# Patient Record
Sex: MALE | Race: WHITE | Employment: UNEMPLOYED | URBAN - METROPOLITAN AREA
[De-identification: names, ages, dates, MRNs, and addresses within clinical notes are randomized per-mention and may not be internally consistent; named-entity substitution may affect disease eponyms.]

---

## 2017-03-03 ENCOUNTER — HOSPITAL ENCOUNTER (EMERGENCY)
Age: 42
Discharge: HOME | End: 2017-03-03
Attending: EMERGENCY MEDICINE
Payer: SELF-PAY

## 2017-03-03 VITALS
BODY MASS INDEX: 29.35 KG/M2 | DIASTOLIC BLOOD PRESSURE: 88 MMHG | HEART RATE: 74 BPM | WEIGHT: 205 LBS | HEIGHT: 70 IN | TEMPERATURE: 97.9 F | SYSTOLIC BLOOD PRESSURE: 148 MMHG | OXYGEN SATURATION: 96 % | RESPIRATION RATE: 18 BRPM

## 2017-03-03 DIAGNOSIS — M54.50 ACUTE LOW BACK PAIN WITHOUT SCIATICA, UNSPECIFIED BACK PAIN LATERALITY: Primary | ICD-10-CM

## 2017-03-03 DIAGNOSIS — M62.830 MUSCLE SPASM OF BACK: ICD-10-CM

## 2017-03-03 PROCEDURE — 99282 EMERGENCY DEPT VISIT SF MDM: CPT

## 2017-03-03 RX ORDER — PHENOL/SODIUM PHENOLATE
20 AEROSOL, SPRAY (ML) MUCOUS MEMBRANE DAILY
COMMUNITY
End: 2018-04-14

## 2017-03-03 RX ORDER — TRAMADOL HYDROCHLORIDE 50 MG/1
50 TABLET ORAL
Qty: 20 TAB | Refills: 0 | Status: SHIPPED | OUTPATIENT
Start: 2017-03-03 | End: 2018-04-14

## 2017-03-03 RX ORDER — CARISOPRODOL 350 MG/1
350 TABLET ORAL 4 TIMES DAILY
Qty: 20 TAB | Refills: 0 | Status: SHIPPED | OUTPATIENT
Start: 2017-03-03 | End: 2018-04-14

## 2017-03-03 NOTE — ED PROVIDER NOTES
HPI Comments: 9:28 AM   Omega Cunningham is a 46 y.o. male presenting to the ED C/O left lower back pain (7/10) with swelling, onset yesterday. Pain is worse with movement. Pt works as a . PMHx lower back pain. Pt denies h/o trauma, urinary or fecal continence, numbness or tingling in the legs, dysuria, hematuria, and any other Sx or complaints. Written by GRADY Francis, as dictated by Tom Yeung PA-C. Patient is a 46 y.o. male presenting with back pain. The history is provided by the patient. No  was used. Back Pain    This is a new problem. The current episode started yesterday. The pain is present in the lower back. The pain is at a severity of 7/10. Pertinent negatives include no numbness (or tingling in the legs) and no dysuria. Past Medical History:   Diagnosis Date    Acid reflux     Asthma     Depression     High cholesterol     Hypertension        History reviewed. No pertinent surgical history. History reviewed. No pertinent family history. Social History     Social History    Marital status:      Spouse name: N/A    Number of children: N/A    Years of education: N/A     Occupational History    Not on file. Social History Main Topics    Smoking status: Current Every Day Smoker     Packs/day: 0.50    Smokeless tobacco: Never Used    Alcohol use Yes      Comment: now and then    Drug use: No    Sexual activity: Not on file     Other Topics Concern    Not on file     Social History Narrative         ALLERGIES: Pcn [penicillins]    Review of Systems   Genitourinary: Negative for dysuria and hematuria. - urinary or fecal incontinence    Musculoskeletal: Positive for back pain (Lower with swelling). Neurological: Negative for numbness (or tingling in the legs). All other systems reviewed and are negative.       Vitals:    03/03/17 0921   BP: 148/88   Pulse: 74   Resp: 18   Temp: 97.9 °F (36.6 °C)   SpO2: 96% Weight: 93 kg (205 lb)   Height: 5' 10\" (1.778 m)            Physical Exam   Constitutional: He is oriented to person, place, and time. He appears well-developed and well-nourished. No distress. HENT:   Head: Normocephalic and atraumatic. Eyes: Conjunctivae and EOM are normal. Pupils are equal, round, and reactive to light. Neck: Normal range of motion. Neck supple. Cardiovascular: Normal rate and regular rhythm. Pulmonary/Chest: Effort normal and breath sounds normal.   Musculoskeletal: Normal range of motion. He exhibits tenderness. He exhibits no edema. Cervical back: Normal.        Thoracic back: Normal.        Lumbar back: He exhibits tenderness and pain. He exhibits normal range of motion, no bony tenderness, no swelling, no edema, no deformity and no laceration. Back:    Negative SLR bilaterally; sensation normal BLE's   Neurological: He is alert and oriented to person, place, and time. He has normal strength. No sensory deficit. Gait normal.   Skin: Skin is warm and dry. Psychiatric: He has a normal mood and affect. His behavior is normal.   Nursing note and vitals reviewed. RESULTS:    No orders to display        Labs Reviewed - No data to display    No results found for this or any previous visit (from the past 12 hour(s)). MDM  Number of Diagnoses or Management Options    ED Course     MEDICATIONS GIVEN:  Medications - No data to display     Procedures    PROGRESS NOTE:   9:28 AM  Initial assessment performed. Written by William Davis ED Scribe, as dictated by Cosmo Jackson PA-C.    DISCHARGE NOTE:  9:35 AM   Omega Cunningham's  results have been reviewed with him. He has been counseled regarding his diagnosis, treatment, and plan. He verbally conveys understanding and agreement of the signs, symptoms, diagnosis, treatment and prognosis and additionally agrees to follow up as discussed.   He also agrees with the care-plan and conveys that all of his questions have been answered. I have also provided discharge instructions for him that include: educational information regarding their diagnosis and treatment, and list of reasons why they would want to return to the ED prior to their follow-up appointment, should his condition change. The patient and/or family have been provided with education for proper Emergency Department utilization. CLINICAL IMPRESSION:    1. Acute low back pain without sciatica, unspecified back pain laterality    2. Muscle spasm of back        AFTER VISIT PLAN:    Current Discharge Medication List      START taking these medications    Details   carisoprodol (SOMA) 350 mg tablet Take 1 Tab by mouth four (4) times daily. Max Daily Amount: 1,400 mg. Qty: 20 Tab, Refills: 0      traMADol (ULTRAM) 50 mg tablet Take 1 Tab by mouth every six (6) hours as needed for Pain. Max Daily Amount: 200 mg. Qty: 20 Tab, Refills: 0         CONTINUE these medications which have NOT CHANGED    Details   Omeprazole delayed release (PRILOSEC D/R) 20 mg tablet Take 20 mg by mouth daily. amphetamine-dextroamphetamine XR (ADDERALL XR) 15 mg XR capsule Take 15 mg by mouth every morning. divalproex DR (DEPAKOTE) 500 mg tablet Take  by mouth three (3) times daily. LISINOPRIL PO Take  by mouth. AMLODIPINE BESYLATE (NORVASC PO) Take  by mouth. PRAVASTATIN SODIUM (PRAVASTATIN PO) Take  by mouth. aspirin 81 mg chewable tablet Take 81 mg by mouth daily. ALBUTEROL IN Take  by inhalation. Follow-up Information     Follow up With Details Comments Contact Info    [facility] Schedule an appointment as soon as possible for a visit in 2 days or follow up with your primary care physician. [address] [facility] [facility] [address]    [facility] EM[facility]  As needed, If symptoms worsen [address]  [address] [facility] [address] [phone]           Attestations:   This note is prepared by Juan Joshua, acting as Scribe for Opal Rodriguez. Felice Brown PA-C: The scribe's documentation has been prepared under my direction and personally reviewed by me in its entirety. I confirm that the note above accurately reflects all work, treatment, procedures, and medical decision making performed by me.

## 2017-03-03 NOTE — LETTER
[facility] [place] 
[facility] EMERGENCY DEPT 
[address] [address] [phone] Work/School Note Date: 3/3/2017 To Whom It May concern: 
 
Omega Cunningham was seen and treated today in the emergency room by the following provider(s): 
Attending Provider: Enrique Bauer MD 
Physician Assistant: ANUSHA Rubin. Omega Cunningham may return to work on Saturday, March 4, 2017.  
 
Sincerely, 
 
 
 
 
Alden Doshi PA-C

## 2017-03-03 NOTE — DISCHARGE INSTRUCTIONS
Back Pain: Care Instructions  Your Care Instructions    Back pain has many possible causes. It is often related to problems with muscles and ligaments of the back. It may also be related to problems with the nerves, discs, or bones of the back. Moving, lifting, standing, sitting, or sleeping in an awkward way can strain the back. Sometimes you don't notice the injury until later. Arthritis is another common cause of back pain. Although it may hurt a lot, back pain usually improves on its own within several weeks. Most people recover in 12 weeks or less. Using good home treatment and being careful not to stress your back can help you feel better sooner. Follow-up care is a key part of your treatment and safety. Be sure to make and go to all appointments, and call your doctor if you are having problems. Its also a good idea to know your test results and keep a list of the medicines you take. How can you care for yourself at home? · Sit or lie in positions that are most comfortable and reduce your pain. Try one of these positions when you lie down:  ¨ Lie on your back with your knees bent and supported by large pillows. ¨ Lie on the floor with your legs on the seat of a sofa or chair. Martha Settle on your side with your knees and hips bent and a pillow between your legs. ¨ Lie on your stomach if it does not make pain worse. · Do not sit up in bed, and avoid soft couches and twisted positions. Bed rest can help relieve pain at first, but it delays healing. Avoid bed rest after the first day of back pain. · Change positions every 30 minutes. If you must sit for long periods of time, take breaks from sitting. Get up and walk around, or lie in a comfortable position. · Try using a heating pad on a low or medium setting for 15 to 20 minutes every 2 or 3 hours. Try a warm shower in place of one session with the heating pad. · You can also try an ice pack for 10 to 15 minutes every 2 to 3 hours.  Put a thin cloth between the ice pack and your skin. · Take pain medicines exactly as directed. ¨ If the doctor gave you a prescription medicine for pain, take it as prescribed. ¨ If you are not taking a prescription pain medicine, ask your doctor if you can take an over-the-counter medicine. · Take short walks several times a day. You can start with 5 to 10 minutes, 3 or 4 times a day, and work up to longer walks. Walk on level surfaces and avoid hills and stairs until your back is better. · Return to work and other activities as soon as you can. Continued rest without activity is usually not good for your back. · To prevent future back pain, do exercises to stretch and strengthen your back and stomach. Learn how to use good posture, safe lifting techniques, and proper body mechanics. When should you call for help? Call your doctor now or seek immediate medical care if:  · You have new or worsening numbness in your legs. · You have new or worsening weakness in your legs. (This could make it hard to stand up.)  · You lose control of your bladder or bowels. Watch closely for changes in your health, and be sure to contact your doctor if:  · Your pain gets worse. · You are not getting better after 2 weeks. Where can you learn more? Go to http://cher-paula.info/. Enter R588 in the search box to learn more about \"Back Pain: Care Instructions. \"  Current as of: May 23, 2016  Content Version: 11.1  © 4145-2759 [employer]. Care instructions adapted under license by [employer] (which disclaims liability or warranty for this information). If you have questions about a medical condition or this instruction, always ask your healthcare professional. [address] any warranty or liability for your use of this information.

## 2018-04-14 ENCOUNTER — HOSPITAL ENCOUNTER (EMERGENCY)
Age: 43
Discharge: HOME | End: 2018-04-15
Attending: EMERGENCY MEDICINE
Payer: SELF-PAY

## 2018-04-14 VITALS
RESPIRATION RATE: 14 BRPM | WEIGHT: 210 LBS | OXYGEN SATURATION: 97 % | SYSTOLIC BLOOD PRESSURE: 154 MMHG | HEART RATE: 121 BPM | TEMPERATURE: 98.4 F | DIASTOLIC BLOOD PRESSURE: 106 MMHG | BODY MASS INDEX: 30.06 KG/M2 | HEIGHT: 70 IN

## 2018-04-14 DIAGNOSIS — R07.89 CHEST WALL PAIN: ICD-10-CM

## 2018-04-14 DIAGNOSIS — F10.929 ALCOHOLIC INTOXICATION WITH COMPLICATION (HCC): Primary | ICD-10-CM

## 2018-04-14 PROCEDURE — 93005 ELECTROCARDIOGRAM TRACING: CPT

## 2018-04-14 PROCEDURE — 81003 URINALYSIS AUTO W/O SCOPE: CPT | Performed by: EMERGENCY MEDICINE

## 2018-04-14 PROCEDURE — 80307 DRUG TEST PRSMV CHEM ANLYZR: CPT | Performed by: EMERGENCY MEDICINE

## 2018-04-14 PROCEDURE — 80053 COMPREHEN METABOLIC PANEL: CPT | Performed by: EMERGENCY MEDICINE

## 2018-04-14 PROCEDURE — 85025 COMPLETE CBC W/AUTO DIFF WBC: CPT | Performed by: EMERGENCY MEDICINE

## 2018-04-14 PROCEDURE — 84484 ASSAY OF TROPONIN QUANT: CPT | Performed by: EMERGENCY MEDICINE

## 2018-04-14 PROCEDURE — 99284 EMERGENCY DEPT VISIT MOD MDM: CPT

## 2018-04-15 ENCOUNTER — APPOINTMENT (OUTPATIENT)
Dept: GENERAL RADIOLOGY | Age: 43
End: 2018-04-15
Attending: EMERGENCY MEDICINE
Payer: SELF-PAY

## 2018-04-15 LAB
ALBUMIN SERPL-MCNC: 3.9 G/DL (ref 3.4–5)
ALBUMIN/GLOB SERPL: 1 {RATIO} (ref 0.8–1.7)
ALP SERPL-CCNC: 91 U/L (ref 45–117)
ALT SERPL-CCNC: 66 U/L (ref 16–61)
AMPHET UR QL SCN: NEGATIVE
ANION GAP SERPL CALC-SCNC: 13 MMOL/L (ref 3–18)
APPEARANCE UR: CLEAR
AST SERPL-CCNC: 38 U/L (ref 15–37)
ATRIAL RATE: 114 BPM
BARBITURATES UR QL SCN: NEGATIVE
BASOPHILS # BLD: 0 K/UL (ref 0–0.06)
BASOPHILS NFR BLD: 0 % (ref 0–2)
BENZODIAZ UR QL: NEGATIVE
BILIRUB SERPL-MCNC: 0.4 MG/DL (ref 0.2–1)
BILIRUB UR QL: NEGATIVE
BUN SERPL-MCNC: 11 MG/DL (ref 7–18)
BUN/CREAT SERPL: 12 (ref 12–20)
CALCIUM SERPL-MCNC: 9.5 MG/DL (ref 8.5–10.1)
CALCULATED P AXIS, ECG09: 60 DEGREES
CALCULATED R AXIS, ECG10: 41 DEGREES
CALCULATED T AXIS, ECG11: 37 DEGREES
CANNABINOIDS UR QL SCN: NEGATIVE
CHLORIDE SERPL-SCNC: 105 MMOL/L (ref 100–108)
CO2 SERPL-SCNC: 26 MMOL/L (ref 21–32)
COCAINE UR QL SCN: NEGATIVE
COLOR UR: YELLOW
CREAT SERPL-MCNC: 0.9 MG/DL (ref 0.6–1.3)
DIAGNOSIS, 93000: NORMAL
DIFFERENTIAL METHOD BLD: NORMAL
EOSINOPHIL # BLD: 0.3 K/UL (ref 0–0.4)
EOSINOPHIL NFR BLD: 3 % (ref 0–5)
ERYTHROCYTE [DISTWIDTH] IN BLOOD BY AUTOMATED COUNT: 12.8 % (ref 11.6–14.5)
ETHANOL SERPL-MCNC: 220 MG/DL (ref 0–3)
GLOBULIN SER CALC-MCNC: 3.8 G/DL (ref 2–4)
GLUCOSE SERPL-MCNC: 97 MG/DL (ref 74–99)
GLUCOSE UR STRIP.AUTO-MCNC: NEGATIVE MG/DL
HCT VFR BLD AUTO: 43.4 % (ref 36–48)
HDSCOM,HDSCOM: NORMAL
HGB BLD-MCNC: 15 G/DL (ref 13–16)
HGB UR QL STRIP: NEGATIVE
KETONES UR QL STRIP.AUTO: NEGATIVE MG/DL
LEUKOCYTE ESTERASE UR QL STRIP.AUTO: NEGATIVE
LYMPHOCYTES # BLD: 3.4 K/UL (ref 0.9–3.6)
LYMPHOCYTES NFR BLD: 36 % (ref 21–52)
MCH RBC QN AUTO: 30 PG (ref 24–34)
MCHC RBC AUTO-ENTMCNC: 34.6 G/DL (ref 31–37)
MCV RBC AUTO: 86.8 FL (ref 74–97)
METHADONE UR QL: NEGATIVE
MONOCYTES # BLD: 0.7 K/UL (ref 0.05–1.2)
MONOCYTES NFR BLD: 7 % (ref 3–10)
NEUTS SEG # BLD: 5.1 K/UL (ref 1.8–8)
NEUTS SEG NFR BLD: 54 % (ref 40–73)
NITRITE UR QL STRIP.AUTO: NEGATIVE
OPIATES UR QL: NEGATIVE
P-R INTERVAL, ECG05: 128 MS
PCP UR QL: NEGATIVE
PH UR STRIP: 6 [PH] (ref 5–8)
PLATELET # BLD AUTO: 235 K/UL (ref 135–420)
PMV BLD AUTO: 10.6 FL (ref 9.2–11.8)
POTASSIUM SERPL-SCNC: 4.6 MMOL/L (ref 3.5–5.5)
PROT SERPL-MCNC: 7.7 G/DL (ref 6.4–8.2)
PROT UR STRIP-MCNC: NEGATIVE MG/DL
Q-T INTERVAL, ECG07: 340 MS
QRS DURATION, ECG06: 96 MS
QTC CALCULATION (BEZET), ECG08: 468 MS
RBC # BLD AUTO: 5 M/UL (ref 4.7–5.5)
SODIUM SERPL-SCNC: 144 MMOL/L (ref 136–145)
SP GR UR REFRACTOMETRY: 1 (ref 1–1.03)
TROPONIN I SERPL-MCNC: <0.02 NG/ML (ref 0–0.06)
UROBILINOGEN UR QL STRIP.AUTO: 0.2 EU/DL (ref 0.2–1)
VENTRICULAR RATE, ECG03: 114 BPM
WBC # BLD AUTO: 9.6 K/UL (ref 4.6–13.2)

## 2018-04-15 NOTE — ED NOTES
Patient armband removed and shredded  I have reviewed discharge instructions with the patient and spouse. The patient and spouse verbalized understanding.

## 2018-04-15 NOTE — ED TRIAGE NOTES
Presented to ED via EMS to be evaluated for reported mid chest pain with pain/numbness to left arm. Patient reports onset this p.m. Patient reports discomfort as documented. Patient states onset while playing \"Nav\". Patient is noted to be able to move affected extremity without difficulty. Patient on CCM with noted sinus tach. Patient also reports \"chest pressure\" x 2 days ago. Reports hx of GERD in which he takes Nexium 20mg. According to EMS Personnel patient became aggressive and was noted to be \"bouncing around like he was in a boxing ring\". It was also reported that patient was noted to be moving reported affected extremity without difficulty. Sepsis Screening completed    (  )Patient meets SIRS criteria. (xPatient does not meet SIRS criteria.       SIRS Criteria is achieved when two or more of the following are present   Temperature < 96.8°F (36°C) or > 100.9°F (38.3°C)   Heart Rate > 90 beats per minute   Respiratory Rate > 20 breaths per minute   WBC count > 12,000 or <4,000 or > 10% bands

## 2018-04-15 NOTE — DISCHARGE INSTRUCTIONS
Alcohol, Drug, or Poison Ingestion: Care Instructions  Your Care Instructions    A person can become very sick, or die, from swallowing or using alcohol, drugs, or poisons. Alcohol poisoning occurs when a person drinks a large amount of alcohol. Alcohol can stop nerve signals that control breathing. It can also stop the gag reflex that prevents choking. Alcohol poisoning is serious. It can lead to brain damage or death if it's not treated right away. Drugs can be used by accident or on purpose. They can be swallowed, inhaled, injected, or absorbed through the skin. Drugs include over-the-counter medicine (such as aspirin or acetaminophen) and prescription medicine. They also include vitamins and supplements. And they include illegal drugs such as cocaine and heroin. And poisons are all around us. They include household , cosmetics, houseplants, and garden chemicals. The doctor has checked you carefully, but problems can develop later. If you notice any problems or new symptoms, get medical treatment right away. Follow-up care is a key part of your treatment and safety. Be sure to make and go to all appointments, and call your doctor if you are having problems. It's also a good idea to know your test results and keep a list of the medicines you take. How can you care for yourself at home? Alcohol problems  · Talk to your doctor or counselor about programs that can help you stop using alcohol. · Plan ways to avoid being tempted to drink. ¨ Get rid of all alcohol in your home. ¨ Avoid places where you tend to drink. ¨ Stay away from places or events that offer alcohol. ¨ Stay away from people who drink a lot. Drug problems  · Talk to your doctor about programs that can help you stop using drugs. · Get rid of any drugs you might be tempted to misuse. · Learn how to say no when other people use drugs. · Don't spend time with people who use drugs.   Poison prevention  · Keep products in the containers they came in. Keep them with the original labels. · Be careful when you use cleaning products, paints, solvents, and pesticides. Read labels before use. Use a fan to move strong odors and fumes out of your home. · Do not mix cleaning products. Try to use nontoxic . These include vinegar, lemon juice, and baking soda. When should you call for help? Poison control centers, hospitals, or your doctor can give immediate advice in the case of a poisoning. The [facility] [employer] number is [phone]. Have the poison container with you so you can give complete information to the poison control center, such as what the poison or substance is, how much was taken and when. Do not try to make the person vomit. ?Call 911 anytime you think you may need emergency care. For example, call if you or someone else:  ? · Has used or currently uses alcohol or drugs and is very confused or can't stay awake. ? · Has passed out (lost consciousness). ? · Has severe trouble breathing. ? · Is having a seizure. ?Call your doctor now or seek immediate medical care if you or someone else:  ? · Has new symptoms, or is not acting normally. ? Watch closely for changes in your health, and be sure to contact your doctor if:  ? · You do not get better as expected. ? · You need help with drug or alcohol problems. ? · You have problems with depression or other mental health issues. Where can you learn more? Go to http://cher-paula.info/. Enter V488 in the search box to learn more about \"Alcohol, Drug, or Poison Ingestion: Care Instructions. \"  Current as of: March 20, 2017  Content Version: 11.4  © 4809-6915 [employer]. Care instructions adapted under license by [employer] (which disclaims liability or warranty for this information).  If you have questions about a medical condition or this instruction, always ask your healthcare professional. [address] any warranty or liability for your use of this information.

## 2018-04-15 NOTE — ED PROVIDER NOTES
EMERGENCY DEPARTMENT HISTORY AND PHYSICAL EXAM    Date: 4/14/2018  Patient Name: Peter Cunningham    History of Presenting Illness     Chief Complaint   Patient presents with    Chest Pain    Arm Pain         History Provided By: Patient    Chief Complaint: arm weakness and numbness  Duration: 3-4 Days  Timing:  Worsening  Location: left arm  Severity: 8 out of 10  Associated Symptoms: left shoulder pain/left sided chest pain    Additional History (Context):     11:48 PM    Omega Cunningham is a 38 y.o. male with pertinent PMHx of HTN, GERD, asthma, and HLD presenting via EMS to the ED c/o left arm weakness and numbness x 3-4 days, which became worse today. Pt states that he was sitting down and playing a game of [employer] when he dropped his arms. Per EMS, at the scene and currently he was using all four extremities normally. Pt notes an associated symptom of 8/10 left shoulder pain and left sided chest pain. The pain has also been intermittent for the last 3-4 days. Pt denies any recent injuries or trauma. Pt has no hx of cardiac disease or DM. Pt ran out of all of his regular medications, as he could not afford the refills. Pt notes a family hx of cardiac disease. Pt specifically denies any bowel changes, urinary sxs, vomiting, fever/chills, or cough. PCP: None  Social Hx: + tobacco use, + alcohol use, - illicit drug use    There are no other complaints, changes, or physical findings at this time. Current Facility-Administered Medications   Medication Dose Route Frequency Provider Last Rate Last Dose    sodium chloride 0.9 % bolus infusion 1,000 mL  1,000 mL IntraVENous ONCE Catherine Rojas MD         Current Outpatient Prescriptions   Medication Sig Dispense Refill    amphetamine-dextroamphetamine XR (ADDERALL XR) 15 mg XR capsule Take 15 mg by mouth every morning.  divalproex DR (DEPAKOTE) 500 mg tablet Take  by mouth three (3) times daily.  LISINOPRIL PO Take  by mouth.         AMLODIPINE BESYLATE (NORVASC PO) Take  by mouth.  PRAVASTATIN SODIUM (PRAVASTATIN PO) Take  by mouth.  aspirin 81 mg chewable tablet Take 81 mg by mouth daily.  ALBUTEROL IN Take  by inhalation. Past History     Past Medical History:  Past Medical History:   Diagnosis Date    Acid reflux     Asthma     Depression     High cholesterol     Hypertension        Past Surgical History:  History reviewed. No pertinent surgical history. Family History:  History reviewed. No pertinent family history. Social History:  Social History   Substance Use Topics    Smoking status: Current Every Day Smoker     Packs/day: 0.50    Smokeless tobacco: Never Used    Alcohol use Yes      Comment: 6 beers tonight       Allergies: Allergies   Allergen Reactions    Pcn [Penicillins] Hives         Review of Systems   Review of Systems   Constitutional: Negative for chills and fever. Cardiovascular: Positive for chest pain. Gastrointestinal: Negative. Negative for vomiting. Genitourinary: Negative. Musculoskeletal: Positive for arthralgias (left shoulder). Neurological: Negative for weakness (left arm) and numbness (left arm). All other systems reviewed and are negative. Physical Exam     Vitals:    04/14/18 2330   BP: (!) 154/106   Pulse: (!) 121   Resp: 14   Temp: 98.4 °F (36.9 °C)   SpO2: 97%   Weight: 95.3 kg (210 lb)   Height: 5' 10\" (1.778 m)     Physical Exam   Constitutional: He is oriented to person, place, and time. He appears well-developed. Smells of ETOH   HENT:   Head: Normocephalic and atraumatic. Eyes: Pupils are equal, round, and reactive to light. Neck: Normal range of motion. Cardiovascular: Regular rhythm. Tachycardia present. Pulmonary/Chest: Effort normal and breath sounds normal. He exhibits tenderness (left upper, chest wall, severe). Abdominal: Soft. Genitourinary: Penis normal.   Musculoskeletal: Normal range of motion.    Neurological: He is alert and oriented to person, place, and time. No cranial nerve deficit. He exhibits normal muscle tone. Coordination normal.   5/5 strength in his biceps, triceps, and shoulder  Finger-nose-finger is nml   Psychiatric: He has a normal mood and affect. Nursing note and vitals reviewed. Diagnostic Study Results     Labs -     Recent Results (from the past 12 hour(s))   URINALYSIS W/ RFLX MICROSCOPIC    Collection Time: 04/14/18 11:50 PM   Result Value Ref Range    Color YELLOW      Appearance CLEAR      Specific gravity 1.005 1.005 - 1.030      pH (UA) 6.0 5.0 - 8.0      Protein NEGATIVE  NEG mg/dL    Glucose NEGATIVE  NEG mg/dL    Ketone NEGATIVE  NEG mg/dL    Bilirubin NEGATIVE  NEG      Blood NEGATIVE  NEG      Urobilinogen 0.2 0.2 - 1.0 EU/dL    Nitrites NEGATIVE  NEG      Leukocyte Esterase NEGATIVE  NEG     DRUG SCREEN, URINE    Collection Time: 04/14/18 11:50 PM   Result Value Ref Range    BENZODIAZEPINES NEGATIVE  NEG      BARBITURATES NEGATIVE  NEG      THC (TH-CANNABINOL) NEGATIVE  NEG      OPIATES NEGATIVE  NEG      PCP(PHENCYCLIDINE) NEGATIVE  NEG      COCAINE NEGATIVE  NEG      AMPHETAMINES NEGATIVE  NEG      METHADONE NEGATIVE  NEG      HDSCOM (NOTE)    CBC WITH AUTOMATED DIFF    Collection Time: 04/14/18 11:50 PM   Result Value Ref Range    WBC 9.6 4.6 - 13.2 K/uL    RBC 5.00 4.70 - 5.50 M/uL    HGB 15.0 13.0 - 16.0 g/dL    HCT 43.4 36.0 - 48.0 %    MCV 86.8 74.0 - 97.0 FL    MCH 30.0 24.0 - 34.0 PG    MCHC 34.6 31.0 - 37.0 g/dL    RDW 12.8 11.6 - 14.5 %    PLATELET 450 591 - 272 K/uL    MPV 10.6 9.2 - 11.8 FL    NEUTROPHILS 54 40 - 73 %    LYMPHOCYTES 36 21 - 52 %    MONOCYTES 7 3 - 10 %    EOSINOPHILS 3 0 - 5 %    BASOPHILS 0 0 - 2 %    ABS. NEUTROPHILS 5.1 1.8 - 8.0 K/UL    ABS. LYMPHOCYTES 3.4 0.9 - 3.6 K/UL    ABS. MONOCYTES 0.7 0.05 - 1.2 K/UL    ABS. EOSINOPHILS 0.3 0.0 - 0.4 K/UL    ABS.  BASOPHILS 0.0 0.0 - 0.06 K/UL    DF AUTOMATED     METABOLIC PANEL, COMPREHENSIVE    Collection Time: 04/14/18 11:50 PM   Result Value Ref Range    Sodium 144 136 - 145 mmol/L    Potassium 4.6 3.5 - 5.5 mmol/L    Chloride 105 100 - 108 mmol/L    CO2 26 21 - 32 mmol/L    Anion gap 13 3.0 - 18 mmol/L    Glucose 97 74 - 99 mg/dL    BUN 11 7.0 - 18 MG/DL    Creatinine 0.90 0.6 - 1.3 MG/DL    BUN/Creatinine ratio 12 12 - 20      GFR est AA >60 >60 ml/min/1.73m2    GFR est non-AA >60 >60 ml/min/1.73m2    Calcium 9.5 8.5 - 10.1 MG/DL    Bilirubin, total PENDING MG/DL    ALT (SGPT) PENDING U/L    AST (SGOT) PENDING U/L    Alk. phosphatase PENDING U/L    Protein, total PENDING g/dL    Albumin PENDING g/dL    Globulin PENDING g/dL    A-G Ratio PENDING         Radiologic Studies -   XR CHEST PA LAT    (Results Pending)     CXR was not done before discharge. Medical Decision Making   I am the first provider for this patient. I reviewed the vital signs, available nursing notes, past medical history, past surgical history, family history and social history. Vital Signs-Reviewed the patient's vital signs. Pulse Oximetry Analysis - 97% on RA     Cardiac Monitor:  Rate: 116 bpm  Rhythm: sinus tachycardia    EKG interpretation: (Preliminary)  Sinus tachycardia at 114 bpm. No acute process. EKG read by Bibiana Fischer MD at [address]     Records Reviewed: Nursing Notes and Old Medical Records    Provider Notes (Medical Decision Making): acute alcohol intoxication. Pt has not been taking any of his home medications and is out of them. Drug intoxication is included in these differentials. Stroke is not a part of the DDx, as he had a nml neuro exam, no numbness, no tingling, and full strength. Pt states that he dropped his cards, but per EMS at the scene and currently he was using all four extremities normally. Pt has no signs of a stroke at this time.     PROCEDURES:  Procedures    MEDICATIONS GIVEN IN THE ED:  Medications   sodium chloride 0.9 % bolus infusion 1,000 mL (not administered)        ED COURSE:   11:48 PM   Initial assessment performed. 12:16 AM - Pt is being belligerent and threatening, stating that this is a \"sorry [facility] hospital\" and that he would like to go home. Pt's fiance is willing is to take him home. Explained that the pt has not had a workup yet, including blood work or CXR. Security and police had to be called, as the pt was continuing to be more belligerent. Fiance is willing to take home. Risks and benefits where discussed with pt and fiance. Diagnosis and Disposition     KINDER DISCHARGE  12:18 AM    I informed the pt that he needed further observation, further workup for adequate evaluation for his numbness, chest pain, and that by refusing the above, he is at risk for myocardial infarction, arrhythmia, sudden death, paralysis, further deterioration. He is awake, alert, and he understands his condition and the risks involved in leaving. He is clinically aware of his surroundings and able to ask appropriate questions, the patients significant other and the nurse present confirms he is not clinically intoxicated and able to make medical decisions. He verbalized knowing the risks and understood it was recommended that he stay and could also return at any time. his significant other was present for the discussion and also verbalized that they understood both diagnosis, risks and could return at any time. They were both provided with warnings regarding worsening of his condition and were provided instructions to follow up with None tomorrow or return to the Emergency Room as soon as possible. This discussion was witnessed by nurse Lauren Perez RN. CLINICAL IMPRESSION:  1. Alcoholic intoxication with complication ([address]    2. Chest wall pain        PLAN:  1. D/C Home  2. Current Discharge Medication List        3.    Follow-up Information     Follow up With Details Comments Contact Info    Ray Arias MD Schedule an appointment as soon as possible for a visit for primary care follow up, as needed [id number] [facility] [address]      [facility] EMERGENCY DEPT  As needed, If symptoms worsen [address] [place] [address]  [phone]        _______________________________    Attestations: This note is prepared by Fouzia Lozoya, acting as Scribe for Crystal Hernández MD.    Crystal Hernández MD:  The scribe's documentation has been prepared under my direction and personally reviewed by me in its entirety.   I confirm that the note above accurately reflects all work, treatment, procedures, and medical decision making performed by me.  _______________________________

## 2020-08-02 ENCOUNTER — HOSPITAL ENCOUNTER (EMERGENCY)
Age: 45
Discharge: HOME | End: 2020-08-02
Attending: EMERGENCY MEDICINE
Payer: MEDICAID

## 2020-08-02 ENCOUNTER — APPOINTMENT (OUTPATIENT)
Dept: GENERAL RADIOLOGY | Age: 45
End: 2020-08-02
Attending: EMERGENCY MEDICINE
Payer: MEDICAID

## 2020-08-02 VITALS
WEIGHT: 219 LBS | DIASTOLIC BLOOD PRESSURE: 82 MMHG | TEMPERATURE: 98.3 F | SYSTOLIC BLOOD PRESSURE: 122 MMHG | RESPIRATION RATE: 19 BRPM | HEART RATE: 99 BPM | BODY MASS INDEX: 31.35 KG/M2 | HEIGHT: 70 IN | OXYGEN SATURATION: 100 %

## 2020-08-02 DIAGNOSIS — F31.9 BIPOLAR 1 DISORDER (HCC): ICD-10-CM

## 2020-08-02 DIAGNOSIS — R07.89 MUSCULOSKELETAL CHEST PAIN: Primary | ICD-10-CM

## 2020-08-02 DIAGNOSIS — Z72.0 TOBACCO USE: ICD-10-CM

## 2020-08-02 DIAGNOSIS — Z78.9 ALCOHOL USE: ICD-10-CM

## 2020-08-02 DIAGNOSIS — M79.602 LEFT ARM PAIN: ICD-10-CM

## 2020-08-02 LAB
ALBUMIN SERPL-MCNC: 4.2 G/DL (ref 3.4–5)
ALBUMIN/GLOB SERPL: 1.1 {RATIO} (ref 0.8–1.7)
ALP SERPL-CCNC: 88 U/L (ref 45–117)
ALT SERPL-CCNC: 47 U/L (ref 16–61)
ANION GAP SERPL CALC-SCNC: 9 MMOL/L (ref 3–18)
AST SERPL-CCNC: 26 U/L (ref 10–38)
BASOPHILS # BLD: 0 K/UL (ref 0–0.1)
BASOPHILS NFR BLD: 0 % (ref 0–2)
BILIRUB SERPL-MCNC: 0.2 MG/DL (ref 0.2–1)
BNP SERPL-MCNC: 23 PG/ML (ref 0–450)
BUN SERPL-MCNC: 9 MG/DL (ref 7–18)
BUN/CREAT SERPL: 9 (ref 12–20)
CALCIUM SERPL-MCNC: 9.4 MG/DL (ref 8.5–10.1)
CHLORIDE SERPL-SCNC: 107 MMOL/L (ref 100–111)
CK MB CFR SERPL CALC: NORMAL % (ref 0–4)
CK MB SERPL-MCNC: <1 NG/ML (ref 5–25)
CK SERPL-CCNC: 126 U/L (ref 39–308)
CO2 SERPL-SCNC: 24 MMOL/L (ref 21–32)
CREAT SERPL-MCNC: 0.95 MG/DL (ref 0.6–1.3)
DIFFERENTIAL METHOD BLD: ABNORMAL
EOSINOPHIL # BLD: 0.3 K/UL (ref 0–0.4)
EOSINOPHIL NFR BLD: 3 % (ref 0–5)
ERYTHROCYTE [DISTWIDTH] IN BLOOD BY AUTOMATED COUNT: 12.5 % (ref 11.6–14.5)
ETHANOL SERPL-MCNC: 225 MG/DL (ref 0–3)
GLOBULIN SER CALC-MCNC: 3.9 G/DL (ref 2–4)
GLUCOSE SERPL-MCNC: 101 MG/DL (ref 74–99)
HCT VFR BLD AUTO: 48.9 % (ref 36–48)
HGB BLD-MCNC: 16.1 G/DL (ref 13–16)
LYMPHOCYTES # BLD: 3.7 K/UL (ref 0.9–3.6)
LYMPHOCYTES NFR BLD: 35 % (ref 21–52)
MCH RBC QN AUTO: 29.9 PG (ref 24–34)
MCHC RBC AUTO-ENTMCNC: 32.9 G/DL (ref 31–37)
MCV RBC AUTO: 90.9 FL (ref 74–97)
MONOCYTES # BLD: 0.7 K/UL (ref 0.05–1.2)
MONOCYTES NFR BLD: 7 % (ref 3–10)
NEUTS SEG # BLD: 5.8 K/UL (ref 1.8–8)
NEUTS SEG NFR BLD: 55 % (ref 40–73)
PLATELET # BLD AUTO: 257 K/UL (ref 135–420)
PMV BLD AUTO: 11 FL (ref 9.2–11.8)
POTASSIUM SERPL-SCNC: 4.1 MMOL/L (ref 3.5–5.5)
PROT SERPL-MCNC: 8.1 G/DL (ref 6.4–8.2)
RBC # BLD AUTO: 5.38 M/UL (ref 4.7–5.5)
SODIUM SERPL-SCNC: 140 MMOL/L (ref 136–145)
TROPONIN I SERPL-MCNC: <0.02 NG/ML (ref 0–0.04)
WBC # BLD AUTO: 10.5 K/UL (ref 4.6–13.2)

## 2020-08-02 PROCEDURE — 80307 DRUG TEST PRSMV CHEM ANLYZR: CPT

## 2020-08-02 PROCEDURE — 74011250637 HC RX REV CODE- 250/637: Performed by: EMERGENCY MEDICINE

## 2020-08-02 PROCEDURE — 82550 ASSAY OF CK (CPK): CPT

## 2020-08-02 PROCEDURE — 80053 COMPREHEN METABOLIC PANEL: CPT

## 2020-08-02 PROCEDURE — 94762 N-INVAS EAR/PLS OXIMTRY CONT: CPT

## 2020-08-02 PROCEDURE — 99285 EMERGENCY DEPT VISIT HI MDM: CPT

## 2020-08-02 PROCEDURE — 83880 ASSAY OF NATRIURETIC PEPTIDE: CPT

## 2020-08-02 PROCEDURE — 85025 COMPLETE CBC W/AUTO DIFF WBC: CPT

## 2020-08-02 PROCEDURE — 71045 X-RAY EXAM CHEST 1 VIEW: CPT

## 2020-08-02 RX ORDER — GUAIFENESIN 100 MG/5ML
81 LIQUID (ML) ORAL
Status: COMPLETED | OUTPATIENT
Start: 2020-08-02 | End: 2020-08-02

## 2020-08-02 RX ORDER — METHOCARBAMOL 500 MG/1
1000 TABLET, FILM COATED ORAL ONCE
Status: COMPLETED | OUTPATIENT
Start: 2020-08-02 | End: 2020-08-02

## 2020-08-02 RX ORDER — METHOCARBAMOL 500 MG/1
1000 TABLET, FILM COATED ORAL 3 TIMES DAILY
Qty: 30 TAB | Refills: 0 | Status: SHIPPED | OUTPATIENT
Start: 2020-08-02

## 2020-08-02 RX ADMIN — METHOCARBAMOL TABLETS 1000 MG: 500 TABLET, COATED ORAL at 02:02

## 2020-08-02 RX ADMIN — ASPIRIN 81 MG 81 MG: 81 TABLET ORAL at 02:03

## 2020-08-02 NOTE — ED PROVIDER NOTES
EMERGENCY DEPARTMENT HISTORY AND PHYSICAL EXAM    Date: 8/2/2020  Patient Name: Yeyo Cunningham    History of Presenting Illness     Chief Complaint   Patient presents with    Chest Pain         History Provided By: Patient    Additional History (Context):   3:25 AM  Omega Cunningham is a 45 y.o. male with PMHX of HTN, GERD, hypercholesterolemia, asthma who presents to the emergency department C/O left sided chest pain. He has had three days of sharp constant pain to the left chest heidi to the lateral pecs region, often shooting towards the left elbow. Symptoms have been constant for three day. Vidya Frizzle He has had significant alcohol use tonight and cannot elaborate further    Social History  He acknowledges alcohol use as well as smoking TOB, denies illegal drugs, heidi cocaine    Family History  No premature heart disease      PCP: None    Current Outpatient Medications   Medication Sig Dispense Refill    methocarbamoL (Robaxin) 500 mg tablet Take 2 Tabs by mouth three (3) times daily. 30 Tab 0    amphetamine-dextroamphetamine XR (ADDERALL XR) 15 mg XR capsule Take 15 mg by mouth every morning.  divalproex DR (DEPAKOTE) 500 mg tablet Take  by mouth three (3) times daily.  LISINOPRIL PO Take  by mouth.  AMLODIPINE BESYLATE (NORVASC PO) Take  by mouth.  PRAVASTATIN SODIUM (PRAVASTATIN PO) Take  by mouth.  aspirin 81 mg chewable tablet Take 81 mg by mouth daily.  ALBUTEROL IN Take  by inhalation. Past History     Past Medical History:  Past Medical History:   Diagnosis Date    Acid reflux     Asthma     Depression     High cholesterol     Hypertension        Past Surgical History:  History reviewed. No pertinent surgical history. Family History:  History reviewed. No pertinent family history.     Social History:  Social History     Tobacco Use    Smoking status: Current Every Day Smoker     Packs/day: 0.50    Smokeless tobacco: Never Used   Substance Use Topics    Alcohol use: Yes     Comment: 6 beers tonight    Drug use: No       Allergies: Allergies   Allergen Reactions    Pcn [Penicillins] Hives         Review of Systems   Review of Systems   Unable to perform ROS: Mental status change   Respiratory: Positive for chest tightness. Cardiovascular: Positive for chest pain. Physical Exam     Vitals:    08/02/20 0112 08/02/20 0210 08/02/20 0230 08/02/20 0312   BP: (!) 149/105 128/71 121/68 122/82   Pulse: 86  98 99   Resp: 18  16 19   Temp: 98.3 °F (36.8 °C)      SpO2: 98% 98% 98% 100%   Weight: 99.3 kg (219 lb)      Height: 5' 10\" (1.778 m)        Physical Exam  Vitals signs and nursing note reviewed. Constitutional:       General: He is not in acute distress. Appearance: He is well-developed. He is not diaphoretic. HENT:      Head: Normocephalic and atraumatic. Right Ear: External ear normal.      Left Ear: External ear normal.      Mouth/Throat:      Pharynx: No oropharyngeal exudate. Eyes:      General: No scleral icterus. Extraocular Movements:      Right eye: Nystagmus present. Left eye: Nystagmus present. Conjunctiva/sclera:      Right eye: Right conjunctiva is injected. Left eye: Left conjunctiva is injected. Pupils: Pupils are equal, round, and reactive to light. Comments: No pallor   Neck:      Musculoskeletal: Normal range of motion and neck supple. Thyroid: No thyromegaly. Vascular: No JVD. Trachea: No tracheal deviation. Cardiovascular:      Rate and Rhythm: Normal rate and regular rhythm. Heart sounds: Normal heart sounds. Pulmonary:      Effort: Pulmonary effort is normal. No respiratory distress. Breath sounds: Normal breath sounds. No stridor. Abdominal:      General: Bowel sounds are normal. There is no distension. Palpations: Abdomen is soft. Tenderness: There is no abdominal tenderness. There is no guarding or rebound. Musculoskeletal: Normal range of motion. General: No tenderness. Comments: No soft tissue injuries   Lymphadenopathy:      Cervical: No cervical adenopathy. Skin:     General: Skin is warm and dry. Findings: No erythema or rash. Neurological:      Mental Status: He is alert and oriented to person, place, and time. GCS: GCS eye subscore is 4. GCS verbal subscore is 5. GCS motor subscore is 6. Cranial Nerves: No cranial nerve deficit. Coordination: Coordination normal.   Psychiatric:         Behavior: Behavior normal.         Thought Content: Thought content normal.         Judgment: Judgment normal.             Diagnostic Study Results     Labs -     Recent Results (from the past 12 hour(s))   CBC WITH AUTOMATED DIFF    Collection Time: 08/02/20  1:20 AM   Result Value Ref Range    WBC 10.5 4.6 - 13.2 K/uL    RBC 5.38 4.70 - 5.50 M/uL    HGB 16.1 (H) 13.0 - 16.0 g/dL    HCT 48.9 (H) 36.0 - 48.0 %    MCV 90.9 74.0 - 97.0 FL    MCH 29.9 24.0 - 34.0 PG    MCHC 32.9 31.0 - 37.0 g/dL    RDW 12.5 11.6 - 14.5 %    PLATELET 800 443 - 714 K/uL    MPV 11.0 9.2 - 11.8 FL    NEUTROPHILS 55 40 - 73 %    LYMPHOCYTES 35 21 - 52 %    MONOCYTES 7 3 - 10 %    EOSINOPHILS 3 0 - 5 %    BASOPHILS 0 0 - 2 %    ABS. NEUTROPHILS 5.8 1.8 - 8.0 K/UL    ABS. LYMPHOCYTES 3.7 (H) 0.9 - 3.6 K/UL    ABS. MONOCYTES 0.7 0.05 - 1.2 K/UL    ABS. EOSINOPHILS 0.3 0.0 - 0.4 K/UL    ABS.  BASOPHILS 0.0 0.0 - 0.1 K/UL    DF AUTOMATED     METABOLIC PANEL, COMPREHENSIVE    Collection Time: 08/02/20  1:20 AM   Result Value Ref Range    Sodium 140 136 - 145 mmol/L    Potassium 4.1 3.5 - 5.5 mmol/L    Chloride 107 100 - 111 mmol/L    CO2 24 21 - 32 mmol/L    Anion gap 9 3.0 - 18 mmol/L    Glucose 101 (H) 74 - 99 mg/dL    BUN 9 7.0 - 18 MG/DL    Creatinine 0.95 0.6 - 1.3 MG/DL    BUN/Creatinine ratio 9 (L) 12 - 20      GFR est AA >60 >60 ml/min/1.73m2    GFR est non-AA >60 >60 ml/min/1.73m2    Calcium 9.4 8.5 - 10.1 MG/DL    Bilirubin, total 0.2 0.2 - 1.0 MG/DL    ALT (SGPT) 47 16 - 61 U/L    AST (SGOT) 26 10 - 38 U/L    Alk. phosphatase 88 45 - 117 U/L    Protein, total 8.1 6.4 - 8.2 g/dL    Albumin 4.2 3.4 - 5.0 g/dL    Globulin 3.9 2.0 - 4.0 g/dL    A-G Ratio 1.1 0.8 - 1.7     CARDIAC PANEL,(CK, CKMB & TROPONIN)    Collection Time: 08/02/20  1:20 AM   Result Value Ref Range    CK - MB <1.0 <3.6 ng/ml    CK-MB Index  0.0 - 4.0 %     CALCULATION NOT PERFORMED WHEN RESULT IS BELOW LINEAR LIMIT     39 - 308 U/L    Troponin-I, QT <0.02 0.0 - 0.045 NG/ML   ETHYL ALCOHOL    Collection Time: 08/02/20  1:20 AM   Result Value Ref Range    ALCOHOL(ETHYL),SERUM 225 (H) 0 - 3 MG/DL   NT-PRO BNP    Collection Time: 08/02/20  1:20 AM   Result Value Ref Range    NT pro-BNP 23 0 - 450 PG/ML       Radiologic Studies -   Port CXR  No acute findings, no cardiomegaly  Formal radiology report pending  Mikayla Nation MD    XR CHEST PORT    (Results Pending)     CT Results  (Last 48 hours)    None        CXR Results  (Last 48 hours)    None          Medications given in the ED-  Medications   aspirin chewable tablet 81 mg (81 mg Oral Given 8/2/20 0203)   methocarbamoL (ROBAXIN) tablet 1,000 mg (1,000 mg Oral Given 8/2/20 0202)         Medical Decision Making   I am the first provider for this patient. I reviewed the vital signs, available nursing notes, past medical history, past surgical history, family history and social history. Vital Signs-Reviewed the patient's vital signs.     Pulse Oximetry Analysis - 100% on room air    Cardiac Monitor:  Rate: 104 bpm  Rhythm: sins tach    EKG interpretation: (Preliminary)  3:25 AM   Sinus tach, rate 110, , [id number], normal ST segments  EKG read by Mikayla Nation MD     Records Reviewed: NURSING NOTES AND PREVIOUS MEDICAL RECORDS  Multiple cardiology visits, but none recently documented    Provider Notes (Medical Decision Making):   Constant pain for three days without changes in troponin or ST segment changes, possible but unlikely ACS.  Tachycardia is more lilkey EtOH than PE    Procedures:  Procedures    ED Course:   3:25 AM: Initial assessment performed. The patients presenting problems have been discussed, and they are in agreement with the care plan formulated and outlined with them. I have encouraged them to ask questions as they arise throughout their visit. Diagnosis and Disposition       DISCHARGE NOTE:    Omega Cunningham's  results have been reviewed with him. He has been counseled regarding his diagnosis, treatment, and plan. He verbally conveys understanding and agreement of the signs, symptoms, diagnosis, treatment and prognosis and additionally agrees to follow up as discussed. He also agrees with the care-plan and conveys that all of his questions have been answered. I have also provided discharge instructions for him that include: educational information regarding their diagnosis and treatment, and list of reasons why they would want to return to the ED prior to their follow-up appointment, should his condition change. He has been provided with education for proper emergency department utilization. CLINICAL IMPRESSION:    1. Musculoskeletal chest pain    2. Left arm pain    3. Alcohol use    4. Tobacco use    5. Bipolar 1 disorder ([facility]        PLAN:  1. D/C Home  2. Discharge Medication List as of 8/2/2020  3:05 AM      START taking these medications    Details   methocarbamoL (Robaxin) 500 mg tablet Take 2 Tabs by mouth three (3) times daily. , Print, Disp-30 Tab,R-0         CONTINUE these medications which have NOT CHANGED    Details   amphetamine-dextroamphetamine XR (ADDERALL XR) 15 mg XR capsule Take 15 mg by mouth every morning., Historical Med      divalproex DR (DEPAKOTE) 500 mg tablet Take  by mouth three (3) times daily. , Historical Med      LISINOPRIL PO Take  by mouth.  , Historical Med      AMLODIPINE BESYLATE (NORVASC PO) Take  by mouth.  , Historical Med      PRAVASTATIN SODIUM (PRAVASTATIN PO) Take by mouth.  , Historical Med      aspirin 81 mg chewable tablet Take 81 mg by mouth daily. , Historical Med      ALBUTEROL IN Take  by inhalation. , Historical Med           3. Follow-up Information     Follow up With Specialties Details Why Contact Info    Kitty Lopez NP Nurse Practitioner In 1 week  [address] [facility]      [facility] EMERGENCY DEPT Emergency Medicine  As needed [address] [place] [id number]  [phone]        _______________________________    This note was partially transcribed via voice recognition software. Although efforts have been made to catch any discrepancies, it may contain sound alike words, grammatical errors, or nonsensical words.

## 2020-08-02 NOTE — DISCHARGE INSTRUCTIONS

## 2020-08-02 NOTE — ED TRIAGE NOTES
Arrives via ems with c/o left side chest pain radiating down his left arm for the past couple days. Pt bc of the pain he has had difficulty maintaining a  on items with his left hand.

## 2023-09-27 ENCOUNTER — HOSPITAL ENCOUNTER (EMERGENCY)
Facility: HOSPITAL | Age: 48
Discharge: HOME | End: 2023-09-28
Attending: EMERGENCY MEDICINE
Payer: MEDICAID

## 2023-09-27 DIAGNOSIS — E86.0 DEHYDRATION: ICD-10-CM

## 2023-09-27 DIAGNOSIS — M25.562 ACUTE PAIN OF LEFT KNEE: Primary | ICD-10-CM

## 2023-09-27 PROCEDURE — 96360 HYDRATION IV INFUSION INIT: CPT

## 2023-09-27 PROCEDURE — 99284 EMERGENCY DEPT VISIT MOD MDM: CPT

## 2023-09-28 VITALS
SYSTOLIC BLOOD PRESSURE: 159 MMHG | TEMPERATURE: 97.9 F | DIASTOLIC BLOOD PRESSURE: 64 MMHG | HEART RATE: 100 BPM | OXYGEN SATURATION: 100 % | RESPIRATION RATE: 18 BRPM

## 2023-09-28 PROCEDURE — 6370000000 HC RX 637 (ALT 250 FOR IP): Performed by: EMERGENCY MEDICINE

## 2023-09-28 PROCEDURE — 2580000003 HC RX 258: Performed by: EMERGENCY MEDICINE

## 2023-09-28 RX ORDER — IBUPROFEN 600 MG/1
600 TABLET ORAL
Status: COMPLETED | OUTPATIENT
Start: 2023-09-28 | End: 2023-09-28

## 2023-09-28 RX ORDER — 0.9 % SODIUM CHLORIDE 0.9 %
1000 INTRAVENOUS SOLUTION INTRAVENOUS ONCE
Status: COMPLETED | OUTPATIENT
Start: 2023-09-28 | End: 2023-09-28

## 2023-09-28 RX ADMIN — IBUPROFEN 600 MG: 600 TABLET, FILM COATED ORAL at 00:51

## 2023-09-28 RX ADMIN — SODIUM CHLORIDE 1000 ML: 9 INJECTION, SOLUTION INTRAVENOUS at 00:46

## 2023-09-28 NOTE — ED PROVIDER NOTES
[facility] EMERGENCY DEPT  EMERGENCY DEPARTMENT ENCOUNTER    Patient Name: Christiano Madrid  MRN: [id number]  YOB: 1975  Provider: Remy Mendoza MD  PCP: No primary care provider on file. Time/Date of evaluation: 12:35 AM EDT on 9/28/23    History of Presenting Illness     History Provided by: Patient  History is limited by: Nothing     HISTORY:   Ancelmo Rahman is a 41 y.o. male presenting with left knee pain that started today. He denies any injury. He was just released from senior care today and had to walk about 7 hours. He also endorses some lightheadedness. He did not have anything to eat or drink all day. Nursing Notes were all reviewed and agreed with or any disagreements were addressed in the HPI. Past History     PAST MEDICAL HISTORY:  Past Medical History:   Diagnosis Date    Acid reflux     Asthma     Depression     High cholesterol     Hypertension     Schizophrenia ([address])        PAST SURGICAL HISTORY:  History reviewed. No pertinent surgical history. FAMILY HISTORY:  History reviewed. No pertinent family history. SOCIAL HISTORY:  Social History     Tobacco Use    Smoking status: Every Day     Packs/day: .5     Types: Cigarettes    Smokeless tobacco: Never   Vaping Use    Vaping Use: Never used   Substance Use Topics    Alcohol use: Yes    Drug use: No       MEDICATIONS:  Current Facility-Administered Medications   Medication Dose Route Frequency Provider Last Rate Last Admin    sodium chloride 0.9 % bolus 1,000 mL  1,000 mL IntraVENous Once Remy Mendoza MD        ibuprofen (ADVIL;MOTRIN) tablet 600 mg  600 mg Oral NOW Remy Mendoza MD         Current Outpatient Medications   Medication Sig Dispense Refill    ALBUTEROL IN Inhale into the lungs      LISINOPRIL PO Take by mouth      amphetamine-dextroamphetamine (ADDERALL XR) 15 MG extended release capsule Take 15 mg by mouth.       aspirin 81 MG chewable tablet Take 81 mg by mouth daily      divalproex (DEPAKOTE)

## 2023-09-28 NOTE — ED TRIAGE NOTES
Pt arrives alert + oriented c/o L knee pain starting this evening. Pt states he was discharged from [place] today states he has been walking for 7 hours from [facility], now his knee hurts. Pt has a patent airway +PMS in all extremities   Pt reports \"thanks to my wife, now Im homeless\".

## 2024-01-16 ENCOUNTER — HOSPITAL ENCOUNTER (EMERGENCY)
Facility: HOSPITAL | Age: 49
Discharge: HOME | End: 2024-01-16
Payer: MEDICAID

## 2024-01-16 ENCOUNTER — APPOINTMENT (OUTPATIENT)
Facility: HOSPITAL | Age: 49
End: 2024-01-16
Payer: MEDICAID

## 2024-01-16 VITALS
RESPIRATION RATE: 18 BRPM | BODY MASS INDEX: 30.49 KG/M2 | HEART RATE: 74 BPM | TEMPERATURE: 97.3 F | DIASTOLIC BLOOD PRESSURE: 81 MMHG | SYSTOLIC BLOOD PRESSURE: 139 MMHG | HEIGHT: 70 IN | WEIGHT: 213 LBS | OXYGEN SATURATION: 100 %

## 2024-01-16 DIAGNOSIS — M72.2 PLANTAR FASCIITIS: Primary | ICD-10-CM

## 2024-01-16 PROCEDURE — 73630 X-RAY EXAM OF FOOT: CPT

## 2024-01-16 PROCEDURE — 99283 EMERGENCY DEPT VISIT LOW MDM: CPT

## 2024-01-16 NOTE — ED PROVIDER NOTES
daily      divalproex (DEPAKOTE) 500 MG DR tablet Take by mouth 3 times daily      methocarbamol (ROBAXIN) 500 MG tablet Take 1,000 mg by mouth 3 times daily            PHYSICAL EXAM      Vitals:    01/16/24 1624   BP: 139/81   Pulse: 74   Resp: 18   Temp: 97.3 °F (36.3 °C)   SpO2: 100%   Weight: 96.6 kg (213 lb)   Height: 1.778 m (5' 10\")     Physical Exam  Vital signs and nursing notes reviewed.    CONSTITUTIONAL: Alert. Well-appearing; well-nourished; in no apparent distress.  EXT: LLE: Generalized tenderness from the heel to the arch of the foot without swelling erythema, ecchymosis or foreign.  Rest of foot nontender.  No pain with lateral squeeze of the forefoot.  Sensation tact light 2+ DP pulses.  Cap refill less than 2 seconds all toes.  Moves all toes.  SKIN: Normal for age and race; warm; dry; good turgor; no apparent lesions or exudate.  NEURO: A & O x3.  Motor 5/5 bilaterally. Sensation intact.   PSYCH:  Mood and affect appropriate.        DIAGNOSTIC RESULTS   LABS:    No results found for this or any previous visit (from the past 24 hour(s)).    Labs Reviewed - No data to display       RADIOLOGY:  Non-plain film images such as CT, Ultrasound and MRI are read by the radiologist. Plain radiographic images are visualized and preliminarily interpreted by the ED Provider with the below findings:          Interpretation per the Radiologist below, if available at the time of this note:  XR FOOT LEFT (MIN 3 VIEWS)   Final Result   No acute abnormality.              PROCEDURES   Unless otherwise noted below, none  Procedures         CRITICAL CARE TIME   none    EMERGENCY DEPARTMENT COURSE and DIFFERENTIAL DIAGNOSIS/MDM   Vitals:    Vitals:    01/16/24 1624   BP: 139/81   Pulse: 74   Resp: 18   Temp: 97.3 °F (36.3 °C)   SpO2: 100%   Weight: 96.6 kg (213 lb)   Height: 1.778 m (5' 10\")       Patient was given the following medications:  Medications - No data to display        Records Reviewed (source and summary):